# Patient Record
Sex: MALE | URBAN - METROPOLITAN AREA
[De-identification: names, ages, dates, MRNs, and addresses within clinical notes are randomized per-mention and may not be internally consistent; named-entity substitution may affect disease eponyms.]

---

## 2017-09-18 ENCOUNTER — OFFICE VISIT (OUTPATIENT)
Dept: ORTHOPEDICS | Facility: CLINIC | Age: 42
End: 2017-09-18
Payer: COMMERCIAL

## 2017-09-18 ENCOUNTER — RADIANT APPOINTMENT (OUTPATIENT)
Dept: GENERAL RADIOLOGY | Facility: CLINIC | Age: 42
End: 2017-09-18
Attending: FAMILY MEDICINE
Payer: COMMERCIAL

## 2017-09-18 VITALS
DIASTOLIC BLOOD PRESSURE: 84 MMHG | HEIGHT: 77 IN | SYSTOLIC BLOOD PRESSURE: 126 MMHG | OXYGEN SATURATION: 98 % | WEIGHT: 298 LBS | HEART RATE: 71 BPM | BODY MASS INDEX: 35.19 KG/M2

## 2017-09-18 DIAGNOSIS — M79.672 ACUTE PAIN OF LEFT FOOT: ICD-10-CM

## 2017-09-18 DIAGNOSIS — M79.672 ACUTE PAIN OF LEFT FOOT: Primary | ICD-10-CM

## 2017-09-18 PROCEDURE — 99203 OFFICE O/P NEW LOW 30 MIN: CPT | Performed by: FAMILY MEDICINE

## 2017-09-18 PROCEDURE — 73630 X-RAY EXAM OF FOOT: CPT | Mod: LT | Performed by: RADIOLOGY

## 2017-09-18 RX ORDER — BUPROPION HYDROCHLORIDE 150 MG/1
150 TABLET ORAL
COMMUNITY
Start: 2017-08-28

## 2017-09-18 RX ORDER — GABAPENTIN 100 MG/1
CAPSULE ORAL
COMMUNITY
Start: 2016-12-07

## 2017-09-18 ASSESSMENT — PAIN SCALES - GENERAL: PAINLEVEL: WORST PAIN (10)

## 2017-09-18 NOTE — NURSING NOTE
"Obie Rivera's goals for this visit include: Evaluate and treat left heel pain.   He requests these members of his care team be copied on today's visit information: yes    PCP: No primary care provider on file.    Referring Provider:  Referred Self, MD  No address on file    Chief Complaint   Patient presents with     Consult     Ankle/Foot left     Left heel pain x 1 year.        Initial /84 (BP Location: Left arm, Patient Position: Chair, Cuff Size: Adult Large)  Pulse 71  Ht 1.956 m (6' 5\")  Wt 135.2 kg (298 lb)  SpO2 98%  BMI 35.34 kg/m2 Estimated body mass index is 35.34 kg/(m^2) as calculated from the following:    Height as of this encounter: 1.956 m (6' 5\").    Weight as of this encounter: 135.2 kg (298 lb).  Medication Reconciliation: complete    "

## 2017-09-18 NOTE — MR AVS SNAPSHOT
After Visit Summary   2017    Obie Rivera    MRN: [id number]           Patient Information     Date Of Birth          1975        Visit Information        Provider Department      2017 9:40 AM Brendan Jones, DO [facility]        Today's Diagnoses     Acute pain of left foot    -  1      Care Instructions    Thanks for coming today.  Ortho[facility] Clinic  [address] [place] [address]    To schedule future appointments in Ortho Clinic, you may call [phone].    To schedule ordered imaging by your provider:   Call Central Imaging [phone]    To schedule an injection ordered by your provider:  Call Central Imaging Injection scheduling line: [phone]  [employer] available online at:  [email or web address]    Please call if any further questions or concerns ([phone]).  Clinic hours 8 am to 5 pm.    Return to clinic (call) if symptoms worsen or fail to improve.            Follow-ups after your visit        Additional Services     PHYSICAL THERAPY REFERRAL       Please eval and treat for plantar fasciitis.  Please also incorporate eccentric training, make HEP. Modalities prn                  Who to contact     If you have questions or need follow up information about today's clinic visit or your schedule please contact [facility] directly at [phone].  Normal or non-critical lab and imaging results will be communicated to you by MyChart, letter or phone within 4 business days after the clinic has received the results. If you do not hear from us within 7 days, please contact the clinic through MyChart or phone. If you have a critical or abnormal lab result, we will notify you by phone as soon as possible.  Submit refill requests through [employer] or call your pharmacy and they will forward the refill request to us. Please allow 3 business days for your refill to be completed.          Additional  "Information About Your Visit        MyChart Information     Mar[employer] gives you secure access to your electronic health record. If you see a primary care provider, you can also send messages to your care team and make appointments. If you have questions, please call your primary care clinic.  If you do not have a primary care provider, please call [phone] and they will assist you.      Mar[employer] is an electronic gateway that provides easy, online access to your medical records. With [employer], you can request a clinic appointment, read your test results, renew a prescription or communicate with your care team.     To access your existing account, please contact your [facility] or call [phone] for assistance.        Care EveryWhere ID     This is your Care EveryWhere ID. This could be used by other organizations to access your [place] medical records  [id number]        Your Vitals Were     Pulse Height Pulse Oximetry BMI (Body Mass Index)          71 1.956 m (6' 5\") 98% 35.34 kg/m2         Blood Pressure from Last 3 Encounters:   09/18/17 126/84    Weight from Last 3 Encounters:   09/18/17 135.2 kg (298 lb)              We Performed the Following     PHYSICAL THERAPY REFERRAL        Primary Care Provider    None Specified       No primary provider on file.        Equal Access to Services     MOHIT POLLARD : Hadii michelle Bloom, waleslieda luradha, qaybta kaalmada adeilianada, keira harrison. So [facility] [phone].    ATENCIÓN: Si habla español, tiene a dobson disposición servicios gratuitos de asistencia lingüística. Llame al [phone].    We comply with applicable federal civil rights laws and [place] laws. We do not discriminate on the basis of race, color, national origin, age, disability sex, sexual orientation or gender identity.            Thank you!     Thank you for choosing [facility]  for your care. Our goal is always to " provide you with excellent care. Hearing back from our patients is one way we can continue to improve our services. Please take a few minutes to complete the written survey that you may receive in the mail after your visit with us. Thank you!             Your Updated Medication List - Protect others around you: Learn how to safely use, store and throw away your medicines at www.disposemymeds.org.          This list is accurate as of: 9/18/17 11:38 AM.  Always use your most recent med list.                   Brand Name Dispense Instructions for use Diagnosis    buPROPion 150 MG 24 hr tablet    WELLBUTRIN XL     Take 150 mg by mouth        gabapentin 100 MG capsule    NEURONTIN

## 2017-09-18 NOTE — PROGRESS NOTES
"HISTORY OF PRESENT ILLNESS  Mr. Rivera is a pleasant 42 year old year old male who presents to clinic today with left heel pain.  Sal's heel has been bothering him for about a year of more.  Unclear event.  Worse in the morning, worse with first step. We'll also become painful after he is been immobile for a while, such as when he drives about 45 minutes to go to work everyday.  Points to the bottom of his heel, achy but sharp when he walks or presses on it. He was recently seen and diagnosed with heel bursitis and was offered an injection, he declined this. He was also given exercises which she hasn't started it.    Severity: Severe  Timing: occurs intermittently  Modifying factors:  resting and non-use makes it better, movement and use makes it worse  Associated signs & symptoms: none  Previous similar pain: yes  Additional history: as documented    MEDICAL HISTORY  There is no problem list on file for this patient.      No current outpatient prescriptions on file.       Allergies not on file    No family history on file.    Additional medical/Social/Surgical histories reviewed in [employer] and updated as appropriate.     REVIEW OF SYSTEMS (9/18/2017)  10 point ROS of systems including Constitutional, Eyes, Respiratory, Cardiovascular, Gastroenterology, Genitourinary, Integumentary, Musculoskeletal, Psychiatric were all negative except for pertinent positives noted in my HPI.     PHYSICAL EXAM  Vitals:    09/18/17 0939   BP: 126/84   BP Location: Left arm   Patient Position: Chair   Cuff Size: Adult Large   Pulse: 71   SpO2: 98%   Weight: 135.2 kg (298 lb)   Height: 1.956 m (6' 5\")     General  - normal appearance, in no obvious distress  CV  - normal pulses at posterior tib and dorsalis pedis  Pulm  - normal respiratory pattern, non-labored  Musculoskeletal - left foot  - stance: normal gait without limp, normal stance without excessive pronation, normal heel inversion with standing heel raise, no obvious leg length " discrepancy, normal heel and toe walk  - inspection: no swelling or effusion,  normal bone and joint alignment, no obvious deformity  - palpation: tender at the central calcaneal tubercle  - ROM: normal active and passive ROM of great and lesser toes, no pain with MT translation  - strength: 5/5 in all planes  Neuro  - no sensory or motor deficit, grossly normal coordination, normal muscle tone  Skin  - no ecchymosis, erythema, warmth, or induration, no obvious rash  Psych  - interactive, appropriate, normal mood and affect          ASSESSMENT & PLAN  Mr. Rivera is a 42 year old year old male who is in the office today with heel pain for the past year.    I ordered & reviewed a plain radiograph which shows a small calcaneal enthesophytes, no acute findings.    While not exactly in the classic location, I do believe that and he is most likely suffering from chronic plantar fasciitis.  We discussed common management techniques including icing, NSAIDs, and manual therapies.  I am referring him to physical therapy for former evaluation, hopefully they can provide some good exercises, including eccentrics, that can help with his pain.    Anion I should follow up in 4-6 weeks.  If no better, or worsening, I'd likely obtain an MRI.    It was a pleasure taking care of Obie.        Brendan Jones, , [facility]

## 2017-09-18 NOTE — PATIENT INSTRUCTIONS
Thanks for coming today.  Ortho[facility] Medicine Clinic  [address] [place] [address]    To schedule future appointments in Ortho Clinic, you may call [phone].    To schedule ordered imaging by your provider:   Call Central Imaging [phone]    To schedule an injection ordered by your provider:  Call Central Imaging Injection scheduling line: [phone]  [employer] available online at:  [email or web address]    Please call if any further questions or concerns ([phone]).  Clinic hours 8 am to 5 pm.    Return to clinic (call) if symptoms worsen or fail to improve.

## 2019-10-03 ENCOUNTER — HEALTH MAINTENANCE LETTER (OUTPATIENT)
Age: 44
End: 2019-10-03

## 2021-01-15 ENCOUNTER — HEALTH MAINTENANCE LETTER (OUTPATIENT)
Age: 46
End: 2021-01-15

## 2021-09-05 ENCOUNTER — HEALTH MAINTENANCE LETTER (OUTPATIENT)
Age: 46
End: 2021-09-05

## 2022-02-20 ENCOUNTER — HEALTH MAINTENANCE LETTER (OUTPATIENT)
Age: 47
End: 2022-02-20

## 2022-10-23 ENCOUNTER — HEALTH MAINTENANCE LETTER (OUTPATIENT)
Age: 47
End: 2022-10-23

## 2023-04-02 ENCOUNTER — HEALTH MAINTENANCE LETTER (OUTPATIENT)
Age: 48
End: 2023-04-02